# Patient Record
Sex: FEMALE | Race: BLACK OR AFRICAN AMERICAN | URBAN - METROPOLITAN AREA
[De-identification: names, ages, dates, MRNs, and addresses within clinical notes are randomized per-mention and may not be internally consistent; named-entity substitution may affect disease eponyms.]

---

## 2022-04-01 LAB
HUMAN PAPILLOMAVIRUS (HPV): NORMAL
PAP RECOMMENDATION EXT: NORMAL
PAP SMEAR: NORMAL

## 2023-01-13 ENCOUNTER — DOCUMENTATION ONLY (OUTPATIENT)
Dept: ADMINISTRATIVE | Facility: HOSPITAL | Age: 43
End: 2023-01-13

## 2024-09-25 ENCOUNTER — OFFICE VISIT (OUTPATIENT)
Dept: OBSTETRICS AND GYNECOLOGY | Facility: CLINIC | Age: 44
End: 2024-09-25
Payer: MEDICAID

## 2024-09-25 VITALS
DIASTOLIC BLOOD PRESSURE: 70 MMHG | SYSTOLIC BLOOD PRESSURE: 122 MMHG | WEIGHT: 223 LBS | HEIGHT: 65 IN | BODY MASS INDEX: 37.15 KG/M2

## 2024-09-25 DIAGNOSIS — N94.6 DYSMENORRHEA: ICD-10-CM

## 2024-09-25 DIAGNOSIS — Z01.419 WELL WOMAN EXAM WITH ROUTINE GYNECOLOGICAL EXAM: Primary | ICD-10-CM

## 2024-09-25 DIAGNOSIS — Z98.51 HISTORY OF BILATERAL TUBAL LIGATION: ICD-10-CM

## 2024-09-25 DIAGNOSIS — Z12.31 BREAST CANCER SCREENING BY MAMMOGRAM: ICD-10-CM

## 2024-09-25 DIAGNOSIS — Z12.4 CERVICAL CANCER SCREENING: ICD-10-CM

## 2024-09-25 DIAGNOSIS — N92.0 MENORRHAGIA WITH REGULAR CYCLE: ICD-10-CM

## 2024-09-25 DIAGNOSIS — Z86.018 H/O UTERINE LEIOMYOMA: ICD-10-CM

## 2024-09-25 PROCEDURE — 3008F BODY MASS INDEX DOCD: CPT | Mod: CPTII,,,

## 2024-09-25 PROCEDURE — 1159F MED LIST DOCD IN RCRD: CPT | Mod: CPTII,,,

## 2024-09-25 PROCEDURE — 3078F DIAST BP <80 MM HG: CPT | Mod: CPTII,,,

## 2024-09-25 PROCEDURE — 87624 HPV HI-RISK TYP POOLED RSLT: CPT

## 2024-09-25 PROCEDURE — 99396 PREV VISIT EST AGE 40-64: CPT | Mod: ,,,

## 2024-09-25 PROCEDURE — 1160F RVW MEDS BY RX/DR IN RCRD: CPT | Mod: CPTII,,,

## 2024-09-25 PROCEDURE — 3074F SYST BP LT 130 MM HG: CPT | Mod: CPTII,,,

## 2024-09-25 RX ORDER — TRIPROLIDINE/PSEUDOEPHEDRINE 2.5MG-60MG
30 TABLET ORAL EVERY 6 HOURS PRN
Qty: 480 ML | Refills: 6 | Status: SHIPPED | OUTPATIENT
Start: 2024-09-25

## 2024-09-25 RX ORDER — AMLODIPINE BESYLATE 10 MG/1
10 TABLET ORAL DAILY
COMMUNITY

## 2024-09-25 NOTE — PROGRESS NOTES
Chief Complaint:   Well Woman (Wants to discuss hysterectomy for fibroids. Patient was scheduled back in  but had to cancel d/t needing surgery on her leg. )     History of Present Illness:  Yaneli Keller is a 43 y.o. year old  presents for her well woman exam. Currently relying on BTL for birth control. LMP 24. Having regular monthly cycles lasting 6 days with heavy bleeding and severe dysmenorrhea. Denies any irregular menstrual bleeding.  Patient would like to discuss having a hysterectomy for uterine fibroids.  States she was scheduled back in  for a TVH with Dr. Mitchell but ended up having to cancel due to breaking her leg. 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        Gyn History:  Menstrual History  Cycle: Yes (LMP: 24)  Menarche Age: 1 years  Flow Duration: 6  Flow: (!) Heavy  Interval: 28  Intermenstrual Bleeding: No  Dysmenorrhea: Yes  Dysmenorrhea Severity : (!) Severe    Menopause  Menopause Age: 1 years    Pap History  Last pap date: 22  Result: Normal  History of Abnormal Pap: No  HPV Vaccine Completed: No    [place]  Sexually Active: Yes  Sexual Orientation: heterosexual  Postcoital Bleeding: No  Dyspareunia: No  STI History: No  Contraception: Yes  Contraception Type: Tubal ligation/salpingectony    Breast History  Last Breast Imaging Date: No  History of Breast Biopsy: No        Review of Systems:  General/Constitutional: Chills denies. Fatigue/weakness denies. Fever denies. Night sweats denies. Hot flashes denies    Respiratory: Cough denies. Hemoptysis denies. SOB denies. Sputum production denies. Wheezing denies .   Cardiovascular: Chest pain denies. Dizziness denies. Palpitations denies. Swelling in hands/feet denies.                Breast: Dimpling denies. Breast mass denies. Breast pain/tenderness denies. Nipple discharge denies. Puckering denies.  Gastrointestinal: Abdominal pain denies. Blood in stool denies. Constipation denies. Diarrhea denies.  Heartburn denies. Nausea denies. Vomiting denies    Genitourinary: Incontinence denies. Blood in urine denies. Frequent urination denies. Painful urination denies. Urinary urgency denies. Nocturia denies    Gynecologic: Irregular menses denies. Heavy bleeding admits. Painful menses admits. Vaginal discharge denies. Vaginal odor denies. Vaginal itching denies. Vaginal lesion denies. Pelvic pain denies. Decreased libido denies. Vulvar lesion denies. Prolapse of genital organs denies. Painful intercourse denies. Postcoital bleeding denies    Psychiatric: Depression denies. Anxiety denies.     OB History    Para Term  AB Living   4 4 3 1 0 1   SAB IAB Ectopic Multiple Live Births   0 0 0 0 1      # 1 - Date: 96, Sex: Female, Weight: None, GA: None, Type: Vaginal, Spontaneous, Apgar1: None, Apgar5: None, Living: None, Birth Comments: None    # 2 - Date: 98, Sex: Female, Weight: None, GA: None, Type: Vaginal, Spontaneous, Apgar1: None, Apgar5: None, Living: None, Birth Comments: None    # 3 - Date: 99, Sex: Female, Weight: None, GA: None, Type: Vaginal, Spontaneous, Apgar1: None, Apgar5: None, Living: None, Birth Comments: None    # 4 - Date: 00, Sex: Female, Weight: None, GA: None, Type: Vaginal, Spontaneous, Apgar1: None, Apgar5: None, Living: Living, Birth Comments: None      Past Medical History:   Diagnosis Date    Fibroid     Hypertension     Menorrhagia        Current Outpatient Medications:     amLODIPine (NORVASC) 10 MG tablet, Take 10 mg by mouth once daily. (Patient not taking: Reported on 2024), Disp: , Rfl:     ibuprofen 20 mg/mL oral liquid, Take 30 mLs (600 mg total) by mouth every 6 (six) hours as needed for Pain., Disp: 480 mL, Rfl: 6    Review of patient's allergies indicates:   Allergen Reactions    Lisinopril Swelling       Past Surgical History:   Procedure Laterality Date    BILATERAL TUBAL LIGATION      Leg repair surgery       TONSILLECTOMY    "    Family History   Problem Relation Name Age of Onset    Ovarian cancer Other First cousin      Social History     Socioeconomic History    Marital status:    Tobacco Use    Smoking status: Never    Smokeless tobacco: Never   Substance and Sexual Activity    Alcohol use: Not Currently    Drug use: Never    Sexual activity: Yes     Partners: Male     Birth control/protection: See Surgical Hx       Physical Exam:  /70   Ht 5' 5" (1.651 m)   Wt 101.2 kg (223 lb)   LMP 09/08/2024   BMI 37.11 kg/m²     Chaperone: present.       General appearance: healthy, well-nourished and well-developed     Psychiatric: Orientation to time, place and person. Normal mood and affect and active, alert     Skin: Appearance: no rashes or lesions.     Neck:   Neck: supple, FROM, trachea midline. and no masses   Thyroid: no enlargement or nodules and non-tender.       Cardiovascular:   Auscultation: RRR and no murmur.   Peripheral Vascular: no varicosities, LLE edema, RLE edema, calf tenderness, and palpable cord and pedal pulses intact.     Lungs:   Respiratory effort: no intercostal retractions or accessory muscle usage.   Auscultation: no wheezing, rales/crackles, or rhonchi and clear to auscultation.     Breast:   Inspection/Palpation: no tenderness, discrete/distinct masses, skin changes, or abnormal secretions. Nipple appearance normal.     Abdomen:   Auscultation/Inspection/Palpation: no hepatomegaly, splenomegaly, masses, tenderness or CVA tenderness and soft, non-distended bowel sounds preset.    Hernia: no palpable hernias.     Female Genitalia:    Vulva: no masses, tenderness or lesions    Bladder/Urethra: no urethral discharge or mass, normal meatus, bladder non-distended.    Vagina: no tenderness, erythema, cystocele, rectocele, abnormal vaginal discharge or vesicle(s) or ulcers    Cervix: no discharge, no cervical lacerations noted or motion tenderness and grossly normal    Uterus: normal size and shape and " midline, non-tender, and no uterine prolapse.    Adnexa/Parametria: no parametrial tenderness or mass, no adnexal tenderness or ovarian mass.     Lymph Nodes:   Palpation: non tender submandibular nodes, axillary nodes, or inguinal nodes.     Rectal Exam:   Rectum: normal perianal skin.       Assessment/Plan:  1. Well woman exam with routine gynecological exam  Pap   Recommend BSE monthly   Discussed recommendations of annual screening after age of 40 with mammogram  Explained that screening is not 100% reliable. Advised patient if she notices any changes to her breast including a lump, mass, dimpling, discharge, rash, or tenderness she should contact us immediately.     Healthy diet, exercise   MMG  Multivitamin   Seat belt   Sunscreen use   Safe sex/ STI education  Contraception: BTL  Annual labs: per PCP  C-scope: per PCP     2. Menorrhagia with regular cycle  -     US Pelvis Comp with Transvag NON-OB (xpd; Future; Expected date: 10/02/2024  -One swab with leuk, myco, and urea    3. Dysmenorrhea  -     ibuprofen 20 mg/mL oral liquid; Take 30 mLs (600 mg total) by mouth every 6 (six) hours as needed for Pain.  Dispense: 480 mL; Refill: 6    4. H/O uterine leiomyoma  -     US Pelvis Comp with Transvag NON-OB (xpd; Future; Expected date: 10/02/2024    5. Cervical cancer screening    6. Breast cancer screening by mammogram  -     Mammo Digital Screening Bilat w/ Titi; Future; Expected date: 10/02/2024    7. History of bilateral tubal ligation       Patient will perform a menorrhagia workup including pelvic ultrasound and 1 swab.  Patient to follow up with Dr. Mitchell in 2-3 weeks for results review and to discuss a hysterectomy.

## 2024-10-04 NOTE — PROGRESS NOTES
Chief Complaint     discuss hyst (Discussion hyst)    HPI:     Patient is a 43 y.o.  with menorrhagia and dysmenorrhea here to f/u US and discuss management.  She was scheduled for hysterectomy in , but had to cancel due to a broken leg.  She takes ibuprofen 800mg for pain with minimal relief.  She's also been on birth control in the past without relief.  She wants to proceed with surgery now.     Requests refill of HTN med.     Gyn History:    Menstrual History  Cycle: Yes (10/05/2024)  Menarche Age: 13 years  Flow Duration: 4  Flow: (!) Heavy  Interval: 28  Intermenstrual Bleeding: No  Dysmenorrhea: Yes  Dysmenorrhea Severity : (!) Severe    Menopause  Menopause Age: 1 years    Pap History  Last pap date: 24  Result: Normal  History of Abnormal Pap: No  HPV Vaccine Completed: No (0/3)    [place]  Sexually Active: Yes  Sexual Orientation: heterosexual  Postcoital Bleeding: No  Dyspareunia: No  STI History: No  Contraception: Yes  Contraception Type: Tubal ligation/salpingectony    Breast History  Last Breast Imaging Date: No  History of Breast Biopsy: No    10/8/24 pelvic u/s:      Per  previous note 24:  Chief Complaint:   Well Woman (Wants to discuss hysterectomy for fibroids. Patient was scheduled back in  but had to cancel d/t needing surgery on her leg. )      History of Present Illness:  Yaneli Keller is a 43 y.o. year old  presents for her well woman exam. Currently relying on BTL for birth control. LMP 24. Having regular monthly cycles lasting 6 days with heavy bleeding and severe dysmenorrhea. Denies any irregular menstrual bleeding.  Patient would like to discuss having a hysterectomy for uterine fibroids.  States she was scheduled back in  for a TVH with Dr. Mitchell but ended up having to cancel due to breaking her leg. 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      Past Medical History:   Diagnosis Date    Fibroid     Hypertension     Menorrhagia   "      Past Surgical History:   Procedure Laterality Date    BILATERAL TUBAL LIGATION      Leg repair surgery       TONSILLECTOMY         Family History   Problem Relation Name Age of Onset    Ovarian cancer Other First cousin        OB History          4    Para   4    Term   3       1    AB        Living   1         SAB        IAB        Ectopic        Multiple        Live Births   1                 Current Outpatient Medications on File Prior to Visit   Medication Sig Dispense Refill    amLODIPine (NORVASC) 10 MG tablet Take 10 mg by mouth once daily. (Patient not taking: Reported on 2024)      ibuprofen 20 mg/mL oral liquid Take 30 mLs (600 mg total) by mouth every 6 (six) hours as needed for Pain. 480 mL 6     No current facility-administered medications on file prior to visit.       Review of Systems:       Review of Systems   Genitourinary:  Positive for dysmenorrhea, menorrhagia and pelvic pain.   All other systems reviewed and are negative.       Physical Exam:    /74   Temp 97.9 °F (36.6 °C) (Temporal)   Ht 5' 5" (1.651 m)   Wt 98.8 kg (217 lb 12.8 oz)   LMP 10/05/2024 (Exact Date)   BMI 36.24 kg/m²     Physical Exam   Vitals reviewed.   Deferred      Assessment:   1. Menorrhagia with regular cycle    2. Dysmenorrhea    3. Hypertension, unspecified type  -     amLODIPine (NORVASC) 10 MG tablet; Take 1 tablet (10 mg total) by mouth once daily.  Dispense: 30 tablet; Refill: 1    4. BMI 36.0-36.9,adult             Plan:   Discussed symptoms, labs, and imaging.  Discussed hysterectomy in detail.  Plan for TVH/Cysto  Discussed oophorectomy and associated risks including increased risk for cardiovascular disease, osteoporosis, menopausal symptoms (hot flashes, vaginal dryness, etc), and cognitive decline.  Desires ovary sparing procedure if ovaries appear normal  Plan for surgery on 24    Amlodipine refill sent.  She will need to follow up with her PCP for HTN management. "   RTC 1 month to pre-op

## 2024-10-08 ENCOUNTER — HOSPITAL ENCOUNTER (OUTPATIENT)
Dept: RADIOLOGY | Facility: HOSPITAL | Age: 44
Discharge: HOME | End: 2024-10-08
Payer: MEDICAID

## 2024-10-08 DIAGNOSIS — Z86.018 H/O UTERINE LEIOMYOMA: ICD-10-CM

## 2024-10-08 DIAGNOSIS — N92.0 MENORRHAGIA WITH REGULAR CYCLE: ICD-10-CM

## 2024-10-08 PROCEDURE — 76856 US EXAM PELVIC COMPLETE: CPT | Mod: TC

## 2024-10-10 ENCOUNTER — OFFICE VISIT (OUTPATIENT)
Dept: OBSTETRICS AND GYNECOLOGY | Facility: CLINIC | Age: 44
End: 2024-10-10
Payer: MEDICAID

## 2024-10-10 VITALS
TEMPERATURE: 98 F | HEIGHT: 65 IN | WEIGHT: 217.81 LBS | SYSTOLIC BLOOD PRESSURE: 130 MMHG | DIASTOLIC BLOOD PRESSURE: 74 MMHG | BODY MASS INDEX: 36.29 KG/M2

## 2024-10-10 DIAGNOSIS — N92.0 MENORRHAGIA WITH REGULAR CYCLE: Primary | ICD-10-CM

## 2024-10-10 DIAGNOSIS — I10 HYPERTENSION, UNSPECIFIED TYPE: ICD-10-CM

## 2024-10-10 DIAGNOSIS — N94.6 DYSMENORRHEA: ICD-10-CM

## 2024-10-10 PROCEDURE — 3075F SYST BP GE 130 - 139MM HG: CPT | Mod: CPTII,,, | Performed by: OBSTETRICS & GYNECOLOGY

## 2024-10-10 PROCEDURE — 1159F MED LIST DOCD IN RCRD: CPT | Mod: CPTII,,, | Performed by: OBSTETRICS & GYNECOLOGY

## 2024-10-10 PROCEDURE — 99213 OFFICE O/P EST LOW 20 MIN: CPT | Mod: ,,, | Performed by: OBSTETRICS & GYNECOLOGY

## 2024-10-10 PROCEDURE — 3078F DIAST BP <80 MM HG: CPT | Mod: CPTII,,, | Performed by: OBSTETRICS & GYNECOLOGY

## 2024-10-10 PROCEDURE — 3008F BODY MASS INDEX DOCD: CPT | Mod: CPTII,,, | Performed by: OBSTETRICS & GYNECOLOGY

## 2024-10-10 RX ORDER — AMLODIPINE BESYLATE 10 MG/1
10 TABLET ORAL DAILY
Qty: 30 TABLET | Refills: 1 | Status: SHIPPED | OUTPATIENT
Start: 2024-10-10

## 2024-10-10 RX ORDER — METHOCARBAMOL 750 MG/1
TABLET, FILM COATED ORAL
COMMUNITY
Start: 2024-09-17

## 2024-10-22 ENCOUNTER — HOSPITAL ENCOUNTER (OUTPATIENT)
Dept: RADIOLOGY | Facility: HOSPITAL | Age: 44
Discharge: HOME | End: 2024-10-22
Payer: MEDICAID

## 2024-10-22 DIAGNOSIS — Z12.31 BREAST CANCER SCREENING BY MAMMOGRAM: ICD-10-CM

## 2024-10-22 PROCEDURE — 77067 SCR MAMMO BI INCL CAD: CPT | Mod: TC

## 2024-11-08 ENCOUNTER — OFFICE VISIT (OUTPATIENT)
Dept: OBSTETRICS AND GYNECOLOGY | Facility: CLINIC | Age: 44
End: 2024-11-08
Payer: MEDICAID

## 2024-11-08 VITALS — WEIGHT: 224 LBS | SYSTOLIC BLOOD PRESSURE: 130 MMHG | DIASTOLIC BLOOD PRESSURE: 82 MMHG | BODY MASS INDEX: 37.28 KG/M2

## 2024-11-08 DIAGNOSIS — N92.0 MENORRHAGIA WITH REGULAR CYCLE: Primary | ICD-10-CM

## 2024-11-08 DIAGNOSIS — N94.6 DYSMENORRHEA: ICD-10-CM

## 2024-11-08 RX ORDER — CEFAZOLIN SODIUM 2 G/50ML
2 SOLUTION INTRAVENOUS
OUTPATIENT
Start: 2024-11-08

## 2024-11-08 RX ORDER — SODIUM CHLORIDE 9 MG/ML
INJECTION, SOLUTION INTRAVENOUS CONTINUOUS
OUTPATIENT
Start: 2024-11-08

## 2024-11-08 RX ORDER — MUPIROCIN 20 MG/G
OINTMENT TOPICAL
OUTPATIENT
Start: 2024-11-08

## 2024-11-08 RX ORDER — FAMOTIDINE 20 MG/1
20 TABLET, FILM COATED ORAL
OUTPATIENT
Start: 2024-11-08

## 2024-11-08 NOTE — PROGRESS NOTES
History & Physical    Subjective     History of Present Illness:  Patient is a 44 y.o. female  presents today to pre admit for TVH/Cysto on 24 due to menorrhagia, dysmenorrhea.     Gyn History:    Menstrual History  Cycle: Yes (LMP: 11/3/24)  Menarche Age: 13 years  Flow Duration: 4  Flow: (!) Heavy  Interval: 28  Intermenstrual Bleeding: No  Dysmenorrhea: Yes  Dysmenorrhea Severity : (!) Severe    Menopause  Menopause Age: 1 years    Pap History  Last pap date: 24  Result: Normal  History of Abnormal Pap: No  HPV Vaccine Completed: No (0/3)    [place]  Sexually Active: Yes  Sexual Orientation: heterosexual  Postcoital Bleeding: No  Dyspareunia: No  STI History: No  Contraception: Yes  Contraception Type: Tubal ligation/salpingectony    Breast History  Last Breast Imaging Date: No  History of Breast Biopsy: No    Per previous note 10/10/24  Patient is a 43 y.o.  with menorrhagia and dysmenorrhea here to f/u US and discuss management.  She was scheduled for hysterectomy in , but had to cancel due to a broken leg.  She takes ibuprofen 800mg for pain with minimal relief.  She's also been on birth control in the past without relief.  She wants to proceed with surgery now.      Requests refill of HTN med.     10/8/24 pelvic u/s:   FINDINGS:  Uterus:  Size: 10.1 x 7.3 x 0.8 cm  Appearance: No focal mass identified.  There is thickening and heterogeneity of the anterior myometrium with Venetian blind shadowing.  Similar findings may be seen in the setting of adenomyosis.  Endometrium: Normal, measuring 4 mm.  Right ovary:  Size: 2.4 x 2.2 x 1.9 cm  Appearance: Normal  Vascular flow: Normal.  Left ovary:  Size: 2.6 x 2.1 x 1.8 cm  Appearance: Normal  Vascular Flow: Normal.  Free Fluid:  None.  Impression:  Findings in the myometrium suggestive of adenomyosis.      Per  previous note 24:  Chief Complaint:   Well Woman (Wants to discuss hysterectomy for fibroids. Patient was scheduled  back in  but had to cancel d/t needing surgery on her leg. )      History of Present Illness:  Yaneli Keller is a 43 y.o. year old  presents for her well woman exam. Currently relying on BTL for birth control. LMP 24. Having regular monthly cycles lasting 6 days with heavy bleeding and severe dysmenorrhea. Denies any irregular menstrual bleeding.  Patient would like to discuss having a hysterectomy for uterine fibroids.  States she was scheduled back in  for a TVH with Dr. Mitchell but ended up having to cancel due to breaking her leg. 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    Chief Complaint   Patient presents with    Pre op     Pre op for TVH/Cysto       Review of patient's allergies indicates:   Allergen Reactions    Lisinopril Swelling       Current Outpatient Medications   Medication Sig Dispense Refill    amLODIPine (NORVASC) 10 MG tablet Take 1 tablet (10 mg total) by mouth once daily. 30 tablet 1    ibuprofen 20 mg/mL oral liquid Take 30 mLs (600 mg total) by mouth every 6 (six) hours as needed for Pain. 480 mL 6    methocarbamoL (ROBAXIN) 750 MG Tab Take by mouth.       No current facility-administered medications for this visit.       Past Medical History:   Diagnosis Date    Fibroid     Hypertension     Menorrhagia      Past Surgical History:   Procedure Laterality Date    BILATERAL TUBAL LIGATION      Leg repair surgery       TONSILLECTOMY       Family History   Problem Relation Name Age of Onset    Ovarian cancer Other First cousin         age unknown 30s    Breast cancer Maternal Aunt          age unknown    Colon cancer Neg Hx      Uterine cancer Neg Hx       Social History     Tobacco Use    Smoking status: Never     Passive exposure: Never    Smokeless tobacco: Never   Substance Use Topics    Alcohol use: Not Currently    Drug use: Never        Review of Systems:  Review of Systems   Respiratory: Negative.     Cardiovascular: Negative.    Gastrointestinal: Negative.     Genitourinary:  Positive for menstrual problem.          Objective     Vital Signs (Most Recent)  BP: 130/82 (11/08/24 1006)     101.6 kg (224 lb)     Physical Exam:  Physical Exam  Physical Exam  Gen: NAD  Cardio: RRR  Lungs CTA b/l  Abd: Soft, NT  Ext: no CCE         Assessment and Plan   1. Menorrhagia with regular cycle  - Case Request Operating Room: HYSTERECTOMY, TOTAL, VAGINAL, WITH CYSTOSCOPY  - Full code; Standing  - Vital signs; Standing  - Insert peripheral IV; Standing  - Gómez to Gravity; Standing  - POCT glucose; Standing  - Notify physician if BS > 180 for hysterectomy patients; Standing  - Chlorhexidine (CHG) 2% Wipes; Standing  - Notify Physician/Vital Signs Parameters Urine output less than 0.5mL/kg/hr (with indwelling catheter) or 30 mL/hr (without indwelling catheter) or blood glucose greater than 200 mg/dL; Standing  - Notify physician; Standing  - Notify Physician - Potential Need of Opioid Reversal; Standing  - Diet NPO; Standing  - Chlorohexidine Gluconate Bath; Standing  - Place in Outpatient; Standing  - Place sequential compression device; Standing  - Comprehensive metabolic panel; Standing  - CBC auto differential; Standing  - Pregnancy, urine rapid; Standing  - Urinalysis, Reflex to Urine Culture; Standing  - Type & Screen Pre Op; Standing    2. Dysmenorrhea  - Case Request Operating Room: HYSTERECTOMY, TOTAL, VAGINAL, WITH CYSTOSCOPY  - Full code; Standing  - Vital signs; Standing  - Insert peripheral IV; Standing  - Gómez to Gravity; Standing  - POCT glucose; Standing  - Notify physician if BS > 180 for hysterectomy patients; Standing  - Chlorhexidine (CHG) 2% Wipes; Standing  - Notify Physician/Vital Signs Parameters Urine output less than 0.5mL/kg/hr (with indwelling catheter) or 30 mL/hr (without indwelling catheter) or blood glucose greater than 200 mg/dL; Standing  - Notify physician; Standing  - Notify Physician - Potential Need of Opioid Reversal; Standing  - Diet NPO;  Standing  - Chlorohexidine Gluconate Bath; Standing  - Place in Outpatient; Standing  - Place sequential compression device; Standing  - Comprehensive metabolic panel; Standing  - CBC auto differential; Standing  - Pregnancy, urine rapid; Standing  - Urinalysis, Reflex to Urine Culture; Standing  - Type & Screen Pre Op; Standing    3. BMI 37.0-37.9, adult  - Case Request Operating Room: HYSTERECTOMY, TOTAL, VAGINAL, WITH CYSTOSCOPY  - Full code; Standing  - Vital signs; Standing  - Insert peripheral IV; Standing  - Gómez to Gravity; Standing  - POCT glucose; Standing  - Notify physician if BS > 180 for hysterectomy patients; Standing  - Chlorhexidine (CHG) 2% Wipes; Standing  - Notify Physician/Vital Signs Parameters Urine output less than 0.5mL/kg/hr (with indwelling catheter) or 30 mL/hr (without indwelling catheter) or blood glucose greater than 200 mg/dL; Standing  - Notify physician; Standing  - Notify Physician - Potential Need of Opioid Reversal; Standing  - Diet NPO; Standing  - Chlorohexidine Gluconate Bath; Standing  - Place in Outpatient; Standing  - Place sequential compression device; Standing  - Comprehensive metabolic panel; Standing  - CBC auto differential; Standing  - Pregnancy, urine rapid; Standing  - Urinalysis, Reflex to Urine Culture; Standing  - Type & Screen Pre Op; Standing        PLAN:      Discussed symptoms, labs, and imaging.  Discussed planned procedure and associated risks in detail.  Discussed oophorectomy and associated risks including increased risk for cardiovascular disease, osteoporosis, menopausal symptoms (hot flashes, vaginal dryness, etc), and cognitive decline.  Consent given for procedure  Plan for surgery on   NPO after midnight before surgery    Discussed with patient nation wide IV Fluid shortage at this time and inability to perform elective procedures at this time.   Will call pt if surgery needs to be r/s due to IV shortage     Keep f/u appts with PCP for HTN control      RTC post op     This note was transcribed by Char Erwin. There may be transcription errors as a result, however minimal. Effort has been made to ensure accuracy of transcription, but any obvious errors or omissions should be clarified with the author of the document.       I agree with the above documentation.

## 2024-11-08 NOTE — H&P (VIEW-ONLY)
History & Physical    Subjective     History of Present Illness:  Patient is a 44 y.o. female  presents today to pre admit for TVH/Cysto on 24 due to menorrhagia, dysmenorrhea.     Gyn History:    Menstrual History  Cycle: Yes (LMP: 11/3/24)  Menarche Age: 13 years  Flow Duration: 4  Flow: (!) Heavy  Interval: 28  Intermenstrual Bleeding: No  Dysmenorrhea: Yes  Dysmenorrhea Severity : (!) Severe    Menopause  Menopause Age: 1 years    Pap History  Last pap date: 24  Result: Normal  History of Abnormal Pap: No  HPV Vaccine Completed: No (0/3)    [place]  Sexually Active: Yes  Sexual Orientation: heterosexual  Postcoital Bleeding: No  Dyspareunia: No  STI History: No  Contraception: Yes  Contraception Type: Tubal ligation/salpingectony    Breast History  Last Breast Imaging Date: No  History of Breast Biopsy: No    Per previous note 10/10/24  Patient is a 43 y.o.  with menorrhagia and dysmenorrhea here to f/u US and discuss management.  She was scheduled for hysterectomy in , but had to cancel due to a broken leg.  She takes ibuprofen 800mg for pain with minimal relief.  She's also been on birth control in the past without relief.  She wants to proceed with surgery now.      Requests refill of HTN med.     10/8/24 pelvic u/s:   FINDINGS:  Uterus:  Size: 10.1 x 7.3 x 0.8 cm  Appearance: No focal mass identified.  There is thickening and heterogeneity of the anterior myometrium with Venetian blind shadowing.  Similar findings may be seen in the setting of adenomyosis.  Endometrium: Normal, measuring 4 mm.  Right ovary:  Size: 2.4 x 2.2 x 1.9 cm  Appearance: Normal  Vascular flow: Normal.  Left ovary:  Size: 2.6 x 2.1 x 1.8 cm  Appearance: Normal  Vascular Flow: Normal.  Free Fluid:  None.  Impression:  Findings in the myometrium suggestive of adenomyosis.      Per  previous note 24:  Chief Complaint:   Well Woman (Wants to discuss hysterectomy for fibroids. Patient was scheduled  back in  but had to cancel d/t needing surgery on her leg. )      History of Present Illness:  Yaneli Keller is a 43 y.o. year old  presents for her well woman exam. Currently relying on BTL for birth control. LMP 24. Having regular monthly cycles lasting 6 days with heavy bleeding and severe dysmenorrhea. Denies any irregular menstrual bleeding.  Patient would like to discuss having a hysterectomy for uterine fibroids.  States she was scheduled back in  for a TVH with Dr. Mitchell but ended up having to cancel due to breaking her leg. 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    Chief Complaint   Patient presents with    Pre op     Pre op for TVH/Cysto       Review of patient's allergies indicates:   Allergen Reactions    Lisinopril Swelling       Current Outpatient Medications   Medication Sig Dispense Refill    amLODIPine (NORVASC) 10 MG tablet Take 1 tablet (10 mg total) by mouth once daily. 30 tablet 1    ibuprofen 20 mg/mL oral liquid Take 30 mLs (600 mg total) by mouth every 6 (six) hours as needed for Pain. 480 mL 6    methocarbamoL (ROBAXIN) 750 MG Tab Take by mouth.       No current facility-administered medications for this visit.       Past Medical History:   Diagnosis Date    Fibroid     Hypertension     Menorrhagia      Past Surgical History:   Procedure Laterality Date    BILATERAL TUBAL LIGATION      Leg repair surgery       TONSILLECTOMY       Family History   Problem Relation Name Age of Onset    Ovarian cancer Other First cousin         age unknown 30s    Breast cancer Maternal Aunt          age unknown    Colon cancer Neg Hx      Uterine cancer Neg Hx       Social History     Tobacco Use    Smoking status: Never     Passive exposure: Never    Smokeless tobacco: Never   Substance Use Topics    Alcohol use: Not Currently    Drug use: Never        Review of Systems:  Review of Systems   Respiratory: Negative.     Cardiovascular: Negative.    Gastrointestinal: Negative.     Genitourinary:  Positive for menstrual problem.          Objective     Vital Signs (Most Recent)  BP: 130/82 (11/08/24 1006)     101.6 kg (224 lb)     Physical Exam:  Physical Exam  Physical Exam  Gen: NAD  Cardio: RRR  Lungs CTA b/l  Abd: Soft, NT  Ext: no CCE         Assessment and Plan   1. Menorrhagia with regular cycle  - Case Request Operating Room: HYSTERECTOMY, TOTAL, VAGINAL, WITH CYSTOSCOPY  - Full code; Standing  - Vital signs; Standing  - Insert peripheral IV; Standing  - Gómez to Gravity; Standing  - POCT glucose; Standing  - Notify physician if BS > 180 for hysterectomy patients; Standing  - Chlorhexidine (CHG) 2% Wipes; Standing  - Notify Physician/Vital Signs Parameters Urine output less than 0.5mL/kg/hr (with indwelling catheter) or 30 mL/hr (without indwelling catheter) or blood glucose greater than 200 mg/dL; Standing  - Notify physician; Standing  - Notify Physician - Potential Need of Opioid Reversal; Standing  - Diet NPO; Standing  - Chlorohexidine Gluconate Bath; Standing  - Place in Outpatient; Standing  - Place sequential compression device; Standing  - Comprehensive metabolic panel; Standing  - CBC auto differential; Standing  - Pregnancy, urine rapid; Standing  - Urinalysis, Reflex to Urine Culture; Standing  - Type & Screen Pre Op; Standing    2. Dysmenorrhea  - Case Request Operating Room: HYSTERECTOMY, TOTAL, VAGINAL, WITH CYSTOSCOPY  - Full code; Standing  - Vital signs; Standing  - Insert peripheral IV; Standing  - Gómez to Gravity; Standing  - POCT glucose; Standing  - Notify physician if BS > 180 for hysterectomy patients; Standing  - Chlorhexidine (CHG) 2% Wipes; Standing  - Notify Physician/Vital Signs Parameters Urine output less than 0.5mL/kg/hr (with indwelling catheter) or 30 mL/hr (without indwelling catheter) or blood glucose greater than 200 mg/dL; Standing  - Notify physician; Standing  - Notify Physician - Potential Need of Opioid Reversal; Standing  - Diet NPO;  Standing  - Chlorohexidine Gluconate Bath; Standing  - Place in Outpatient; Standing  - Place sequential compression device; Standing  - Comprehensive metabolic panel; Standing  - CBC auto differential; Standing  - Pregnancy, urine rapid; Standing  - Urinalysis, Reflex to Urine Culture; Standing  - Type & Screen Pre Op; Standing    3. BMI 37.0-37.9, adult  - Case Request Operating Room: HYSTERECTOMY, TOTAL, VAGINAL, WITH CYSTOSCOPY  - Full code; Standing  - Vital signs; Standing  - Insert peripheral IV; Standing  - Gómez to Gravity; Standing  - POCT glucose; Standing  - Notify physician if BS > 180 for hysterectomy patients; Standing  - Chlorhexidine (CHG) 2% Wipes; Standing  - Notify Physician/Vital Signs Parameters Urine output less than 0.5mL/kg/hr (with indwelling catheter) or 30 mL/hr (without indwelling catheter) or blood glucose greater than 200 mg/dL; Standing  - Notify physician; Standing  - Notify Physician - Potential Need of Opioid Reversal; Standing  - Diet NPO; Standing  - Chlorohexidine Gluconate Bath; Standing  - Place in Outpatient; Standing  - Place sequential compression device; Standing  - Comprehensive metabolic panel; Standing  - CBC auto differential; Standing  - Pregnancy, urine rapid; Standing  - Urinalysis, Reflex to Urine Culture; Standing  - Type & Screen Pre Op; Standing        PLAN:      Discussed symptoms, labs, and imaging.  Discussed planned procedure and associated risks in detail.  Discussed oophorectomy and associated risks including increased risk for cardiovascular disease, osteoporosis, menopausal symptoms (hot flashes, vaginal dryness, etc), and cognitive decline.  Consent given for procedure  Plan for surgery on   NPO after midnight before surgery    Discussed with patient nation wide IV Fluid shortage at this time and inability to perform elective procedures at this time.   Will call pt if surgery needs to be r/s due to IV shortage     Keep f/u appts with PCP for HTN control      RTC post op     This note was transcribed by Char Erwin. There may be transcription errors as a result, however minimal. Effort has been made to ensure accuracy of transcription, but any obvious errors or omissions should be clarified with the author of the document.       I agree with the above documentation.

## 2024-11-26 ENCOUNTER — HOSPITAL ENCOUNTER (OUTPATIENT)
Dept: PREADMISSION TESTING | Facility: HOSPITAL | Age: 44
Discharge: HOME | End: 2024-11-26
Attending: OBSTETRICS & GYNECOLOGY
Payer: MEDICAID

## 2024-11-26 VITALS — BODY MASS INDEX: 37.32 KG/M2 | WEIGHT: 224 LBS | HEIGHT: 65 IN

## 2024-11-26 DIAGNOSIS — N94.6 DYSMENORRHEA: ICD-10-CM

## 2024-11-26 DIAGNOSIS — N92.0 MENORRHAGIA WITH REGULAR CYCLE: ICD-10-CM

## 2024-11-26 LAB
ALBUMIN SERPL-MCNC: 4.3 G/DL (ref 3.4–5)
ALBUMIN/GLOB SERPL: 1.4 RATIO
ALP SERPL-CCNC: 63 UNIT/L (ref 50–144)
ALT SERPL-CCNC: 11 UNIT/L (ref 1–45)
ANION GAP SERPL CALC-SCNC: 7 MEQ/L (ref 2–13)
ANISOCYTOSIS BLD QL SMEAR: ABNORMAL
AST SERPL-CCNC: 20 UNIT/L (ref 14–36)
BASOPHILS # BLD AUTO: 0.04 X10(3)/MCL (ref 0.01–0.08)
BASOPHILS NFR BLD AUTO: 0.6 % (ref 0.1–1.2)
BILIRUB SERPL-MCNC: 0.3 MG/DL (ref 0–1)
BILIRUB UR QL STRIP.AUTO: NEGATIVE
BUN SERPL-MCNC: 15 MG/DL (ref 7–20)
CALCIUM SERPL-MCNC: 9.3 MG/DL (ref 8.4–10.2)
CHLORIDE SERPL-SCNC: 108 MMOL/L (ref 98–110)
CLARITY UR: CLEAR
CO2 SERPL-SCNC: 24 MMOL/L (ref 21–32)
COLOR UR AUTO: YELLOW
CREAT SERPL-MCNC: 0.72 MG/DL (ref 0.66–1.25)
CREAT/UREA NIT SERPL: 21 (ref 12–20)
ELLIPTOCYTOSIS (OHS): SLIGHT
EOSINOPHIL # BLD AUTO: 0.18 X10(3)/MCL (ref 0.04–0.36)
EOSINOPHIL NFR BLD AUTO: 2.7 % (ref 0.7–7)
ERYTHROCYTE [DISTWIDTH] IN BLOOD BY AUTOMATED COUNT: 18.4 % (ref 11–14.5)
GFR SERPLBLD CREATININE-BSD FMLA CKD-EPI: >90 ML/MIN/1.73/M2
GLOBULIN SER-MCNC: 3.1 GM/DL (ref 2–3.9)
GLUCOSE SERPL-MCNC: 105 MG/DL (ref 70–115)
GLUCOSE UR QL STRIP: NEGATIVE
HCT VFR BLD AUTO: 27.8 % (ref 36–48)
HGB BLD-MCNC: 7.7 G/DL (ref 11.8–16)
HGB UR QL STRIP: NEGATIVE
IMM GRANULOCYTES # BLD AUTO: 0.02 X10(3)/MCL (ref 0–0.03)
IMM GRANULOCYTES NFR BLD AUTO: 0.3 % (ref 0–0.5)
KETONES UR QL STRIP: NEGATIVE
LEUKOCYTE ESTERASE UR QL STRIP: NEGATIVE
LYMPHOCYTES # BLD AUTO: 2.17 X10(3)/MCL (ref 1.16–3.74)
LYMPHOCYTES NFR BLD AUTO: 32.9 % (ref 20–55)
MCH RBC QN AUTO: 17.9 PG (ref 27–34)
MCHC RBC AUTO-ENTMCNC: 27.7 G/DL (ref 31–37)
MCV RBC AUTO: 64.5 FL (ref 79–99)
MICROCYTES BLD QL SMEAR: ABNORMAL
MONOCYTES # BLD AUTO: 0.34 X10(3)/MCL (ref 0.24–0.36)
MONOCYTES NFR BLD AUTO: 5.2 % (ref 4.7–12.5)
NEUTROPHILS # BLD AUTO: 3.84 X10(3)/MCL (ref 1.56–6.13)
NEUTROPHILS NFR BLD AUTO: 58.3 % (ref 37–73)
NITRITE UR QL STRIP: NEGATIVE
NRBC BLD AUTO-RTO: 0 %
PH UR STRIP: 6.5 [PH]
PLATELET # BLD AUTO: 548 X10(3)/MCL (ref 140–371)
PLATELET # BLD EST: ABNORMAL 10*3/UL
PMV BLD AUTO: 8.6 FL (ref 9.4–12.4)
POIKILOCYTOSIS BLD QL SMEAR: SLIGHT
POTASSIUM SERPL-SCNC: 4.8 MMOL/L (ref 3.5–5.1)
PROT SERPL-MCNC: 7.4 GM/DL (ref 6.3–8.2)
PROT UR QL STRIP: NEGATIVE
RBC # BLD AUTO: 4.31 X10(6)/MCL (ref 4–5.1)
RBC MORPH BLD: ABNORMAL
SODIUM SERPL-SCNC: 139 MMOL/L (ref 136–145)
SP GR UR STRIP.AUTO: 1.01 (ref 1–1.03)
UROBILINOGEN UR STRIP-ACNC: 0.2
WBC # BLD AUTO: 6.59 X10(3)/MCL (ref 4–11.5)

## 2024-11-26 PROCEDURE — 85025 COMPLETE CBC W/AUTO DIFF WBC: CPT | Performed by: OBSTETRICS & GYNECOLOGY

## 2024-11-26 PROCEDURE — 80053 COMPREHEN METABOLIC PANEL: CPT | Performed by: OBSTETRICS & GYNECOLOGY

## 2024-11-26 PROCEDURE — 81003 URINALYSIS AUTO W/O SCOPE: CPT | Performed by: OBSTETRICS & GYNECOLOGY

## 2024-11-26 NOTE — DISCHARGE INSTRUCTIONS

## 2024-12-04 ENCOUNTER — ANESTHESIA EVENT (OUTPATIENT)
Dept: SURGERY | Facility: HOSPITAL | Age: 44
End: 2024-12-04
Payer: MEDICAID

## 2024-12-04 NOTE — ANESTHESIA PREPROCEDURE EVALUATION
12/04/2024  Yaneli Keller is a 44 y.o., female.  Surgical History    Procedure Laterality Date Comment Source   BILATERAL TUBAL LIGATION       Leg repair surgery  2022     TONSILLECTOMY         Medical History    Diagnosis Date Comment Source   Fibroid      Hypertension      Menorrhagia          Pre-op Assessment    I have reviewed the Patient Summary Reports.     I have reviewed the Nursing Notes. I have reviewed the NPO Status.   I have reviewed the Medications.     Review of Systems  Anesthesia Hx:  No problems with previous Anesthesia             Denies Family Hx of Anesthesia complications.    Denies Personal Hx of Anesthesia complications.                    Social:  Non-Smoker       Hematology/Oncology:    Oncology Normal    -- Anemia:                                  EENT/Dental:  EENT/Dental Normal           Cardiovascular:  Exercise tolerance: poor   Hypertension                                          Pulmonary:  Pulmonary Normal                       Renal/:  Renal/ Normal                 Hepatic/GI:  Hepatic/GI Normal                    Musculoskeletal:  Musculoskeletal Normal                Neurological:  Neurology Normal                                      Endocrine:  Endocrine Normal          Obesity / BMI > 30  Dermatological:  Skin Normal    Psych:  Psychiatric Normal                    Physical Exam  General: Well nourished, Cooperative, Alert and Oriented    Airway:  Mallampati: III / II/ III  Mouth Opening: Normal  TM Distance: Normal  Tongue: Large  Neck ROM: Normal ROM    Dental:  Intact        Anesthesia Plan  Type of Anesthesia, risks & benefits discussed:    Anesthesia Type: Gen ETT  Intra-op Monitoring Plan: Standard ASA Monitors  Post Op Pain Control Plan: multimodal analgesia  Induction:  IV  Airway Plan: Direct  Informed Consent: Informed consent signed with the Patient  and all parties understand the risks and agree with anesthesia plan.  All questions answered. Patient consented to blood products? Yes  ASA Score: 3  Day of Surgery Review of History & Physical: H&P Update referred to the surgeon/provider.I have interviewed and examined the patient. I have reviewed the patient's H&P dated: There are no significant changes.     Ready For Surgery From Anesthesia Perspective.     .

## 2024-12-05 ENCOUNTER — HOSPITAL ENCOUNTER (OUTPATIENT)
Facility: HOSPITAL | Age: 44
Discharge: HOME | End: 2024-12-05
Attending: OBSTETRICS & GYNECOLOGY | Admitting: OBSTETRICS & GYNECOLOGY
Payer: MEDICAID

## 2024-12-05 ENCOUNTER — ANESTHESIA (OUTPATIENT)
Dept: SURGERY | Facility: HOSPITAL | Age: 44
End: 2024-12-05
Payer: MEDICAID

## 2024-12-05 VITALS
RESPIRATION RATE: 20 BRPM | OXYGEN SATURATION: 99 % | SYSTOLIC BLOOD PRESSURE: 132 MMHG | HEART RATE: 75 BPM | WEIGHT: 224 LBS | DIASTOLIC BLOOD PRESSURE: 73 MMHG | HEIGHT: 65 IN | BODY MASS INDEX: 37.32 KG/M2 | TEMPERATURE: 97 F

## 2024-12-05 DIAGNOSIS — N94.6 DYSMENORRHEA: ICD-10-CM

## 2024-12-05 DIAGNOSIS — N92.0 MENORRHAGIA WITH REGULAR CYCLE: ICD-10-CM

## 2024-12-05 DIAGNOSIS — Z90.710 S/P VAGINAL HYSTERECTOMY: Primary | ICD-10-CM

## 2024-12-05 LAB
ABORH RETYPE: NORMAL
B-HCG UR QL: NEGATIVE
GROUP & RH: NORMAL
HCT VFR BLD AUTO: 29 % (ref 36–48)
HGB BLD-MCNC: 8.5 G/DL (ref 11.8–16)
INDIRECT COOMBS: NORMAL
SPECIMEN OUTDATE: NORMAL

## 2024-12-05 PROCEDURE — 36000708 HC OR TIME LEV III 1ST 15 MIN: Performed by: OBSTETRICS & GYNECOLOGY

## 2024-12-05 PROCEDURE — 37000009 HC ANESTHESIA EA ADD 15 MINS: Performed by: OBSTETRICS & GYNECOLOGY

## 2024-12-05 PROCEDURE — 36000709 HC OR TIME LEV III EA ADD 15 MIN: Performed by: OBSTETRICS & GYNECOLOGY

## 2024-12-05 PROCEDURE — 27201423 OPTIME MED/SURG SUP & DEVICES STERILE SUPPLY: Performed by: OBSTETRICS & GYNECOLOGY

## 2024-12-05 PROCEDURE — 25000003 PHARM REV CODE 250

## 2024-12-05 PROCEDURE — 71000033 HC RECOVERY, INTIAL HOUR: Performed by: OBSTETRICS & GYNECOLOGY

## 2024-12-05 PROCEDURE — 81025 URINE PREGNANCY TEST: CPT | Performed by: OBSTETRICS & GYNECOLOGY

## 2024-12-05 PROCEDURE — 86901 BLOOD TYPING SEROLOGIC RH(D): CPT | Performed by: OBSTETRICS & GYNECOLOGY

## 2024-12-05 PROCEDURE — 25000003 PHARM REV CODE 250: Performed by: OBSTETRICS & GYNECOLOGY

## 2024-12-05 PROCEDURE — P9016 RBC LEUKOCYTES REDUCED: HCPCS | Performed by: OBSTETRICS & GYNECOLOGY

## 2024-12-05 PROCEDURE — 85018 HEMOGLOBIN: CPT | Performed by: OBSTETRICS & GYNECOLOGY

## 2024-12-05 PROCEDURE — 36415 COLL VENOUS BLD VENIPUNCTURE: CPT | Mod: 91 | Performed by: OBSTETRICS & GYNECOLOGY

## 2024-12-05 PROCEDURE — 71000016 HC POSTOP RECOV ADDL HR: Performed by: OBSTETRICS & GYNECOLOGY

## 2024-12-05 PROCEDURE — 25000003 PHARM REV CODE 250: Performed by: NURSE ANESTHETIST, CERTIFIED REGISTERED

## 2024-12-05 PROCEDURE — 63600175 PHARM REV CODE 636 W HCPCS: Performed by: NURSE ANESTHETIST, CERTIFIED REGISTERED

## 2024-12-05 PROCEDURE — 86923 COMPATIBILITY TEST ELECTRIC: CPT | Performed by: OBSTETRICS & GYNECOLOGY

## 2024-12-05 PROCEDURE — 71000015 HC POSTOP RECOV 1ST HR: Performed by: OBSTETRICS & GYNECOLOGY

## 2024-12-05 PROCEDURE — 63600175 PHARM REV CODE 636 W HCPCS

## 2024-12-05 PROCEDURE — 63600175 PHARM REV CODE 636 W HCPCS: Performed by: OBSTETRICS & GYNECOLOGY

## 2024-12-05 PROCEDURE — 37000008 HC ANESTHESIA 1ST 15 MINUTES: Performed by: OBSTETRICS & GYNECOLOGY

## 2024-12-05 RX ORDER — OXYCODONE AND ACETAMINOPHEN 10; 325 MG/1; MG/1
1 TABLET ORAL EVERY 4 HOURS PRN
Status: DISCONTINUED | OUTPATIENT
Start: 2024-12-05 | End: 2024-12-05 | Stop reason: HOSPADM

## 2024-12-05 RX ORDER — IBUPROFEN 600 MG/1
600 TABLET ORAL EVERY 6 HOURS
Status: DISCONTINUED | OUTPATIENT
Start: 2024-12-06 | End: 2024-12-05 | Stop reason: HOSPADM

## 2024-12-05 RX ORDER — HYDROCODONE BITARTRATE AND ACETAMINOPHEN 7.5; 325 MG/1; MG/1
1 TABLET ORAL EVERY 6 HOURS PRN
Qty: 12 TABLET | Refills: 0 | Status: SHIPPED | OUTPATIENT
Start: 2024-12-05

## 2024-12-05 RX ORDER — DIPHENHYDRAMINE HCL 25 MG
25 CAPSULE ORAL EVERY 4 HOURS PRN
Status: DISCONTINUED | OUTPATIENT
Start: 2024-12-05 | End: 2024-12-05 | Stop reason: HOSPADM

## 2024-12-05 RX ORDER — KETOROLAC TROMETHAMINE 30 MG/ML
30 INJECTION, SOLUTION INTRAMUSCULAR; INTRAVENOUS EVERY 8 HOURS
Status: DISCONTINUED | OUTPATIENT
Start: 2024-12-05 | End: 2024-12-05 | Stop reason: HOSPADM

## 2024-12-05 RX ORDER — HYDROMORPHONE HYDROCHLORIDE 2 MG/ML
INJECTION, SOLUTION INTRAMUSCULAR; INTRAVENOUS; SUBCUTANEOUS
Status: DISCONTINUED | OUTPATIENT
Start: 2024-12-05 | End: 2024-12-05

## 2024-12-05 RX ORDER — DEXAMETHASONE SODIUM PHOSPHATE 4 MG/ML
INJECTION, SOLUTION INTRA-ARTICULAR; INTRALESIONAL; INTRAMUSCULAR; INTRAVENOUS; SOFT TISSUE
Status: DISCONTINUED | OUTPATIENT
Start: 2024-12-05 | End: 2024-12-05

## 2024-12-05 RX ORDER — PROPOFOL 10 MG/ML
VIAL (ML) INTRAVENOUS
Status: DISCONTINUED | OUTPATIENT
Start: 2024-12-05 | End: 2024-12-05

## 2024-12-05 RX ORDER — IRON,CARBONYL/ASCORBIC ACID 100-250 MG
1 TABLET ORAL DAILY
Qty: 30 TABLET | Refills: 2 | Status: SHIPPED | OUTPATIENT
Start: 2024-12-05

## 2024-12-05 RX ORDER — BUPIVACAINE HYDROCHLORIDE AND EPINEPHRINE 5; 5 MG/ML; UG/ML
INJECTION, SOLUTION EPIDURAL; INTRACAUDAL; PERINEURAL
Status: DISCONTINUED | OUTPATIENT
Start: 2024-12-05 | End: 2024-12-05 | Stop reason: HOSPADM

## 2024-12-05 RX ORDER — KETOROLAC TROMETHAMINE 30 MG/ML
INJECTION, SOLUTION INTRAMUSCULAR; INTRAVENOUS
Status: DISCONTINUED | OUTPATIENT
Start: 2024-12-05 | End: 2024-12-05

## 2024-12-05 RX ORDER — OXYCODONE AND ACETAMINOPHEN 5; 325 MG/1; MG/1
1 TABLET ORAL EVERY 4 HOURS PRN
Status: DISCONTINUED | OUTPATIENT
Start: 2024-12-05 | End: 2024-12-05 | Stop reason: HOSPADM

## 2024-12-05 RX ORDER — MUPIROCIN 20 MG/G
OINTMENT TOPICAL 2 TIMES DAILY
Status: DISCONTINUED | OUTPATIENT
Start: 2024-12-05 | End: 2024-12-05 | Stop reason: HOSPADM

## 2024-12-05 RX ORDER — BISACODYL 10 MG/1
10 SUPPOSITORY RECTAL DAILY PRN
Status: DISCONTINUED | OUTPATIENT
Start: 2024-12-05 | End: 2024-12-05 | Stop reason: HOSPADM

## 2024-12-05 RX ORDER — LIDOCAINE HYDROCHLORIDE 20 MG/ML
INJECTION INTRAVENOUS
Status: DISCONTINUED | OUTPATIENT
Start: 2024-12-05 | End: 2024-12-05

## 2024-12-05 RX ORDER — HYDROMORPHONE HYDROCHLORIDE 1 MG/ML
1 INJECTION, SOLUTION INTRAMUSCULAR; INTRAVENOUS; SUBCUTANEOUS EVERY 6 HOURS PRN
Status: DISCONTINUED | OUTPATIENT
Start: 2024-12-05 | End: 2024-12-05 | Stop reason: HOSPADM

## 2024-12-05 RX ORDER — CEFAZOLIN 2 G/1
2 INJECTION, POWDER, FOR SOLUTION INTRAMUSCULAR; INTRAVENOUS
Status: COMPLETED | OUTPATIENT
Start: 2024-12-05 | End: 2024-12-05

## 2024-12-05 RX ORDER — FAMOTIDINE 20 MG/1
20 TABLET, FILM COATED ORAL
Status: COMPLETED | OUTPATIENT
Start: 2024-12-05 | End: 2024-12-05

## 2024-12-05 RX ORDER — MUPIROCIN 20 MG/G
OINTMENT TOPICAL
Status: DISCONTINUED | OUTPATIENT
Start: 2024-12-05 | End: 2024-12-05 | Stop reason: HOSPADM

## 2024-12-05 RX ORDER — MIDAZOLAM HYDROCHLORIDE 1 MG/ML
2 INJECTION INTRAMUSCULAR; INTRAVENOUS
Status: COMPLETED | OUTPATIENT
Start: 2024-12-05 | End: 2024-12-05

## 2024-12-05 RX ORDER — SODIUM CHLORIDE, SODIUM LACTATE, POTASSIUM CHLORIDE, CALCIUM CHLORIDE 600; 310; 30; 20 MG/100ML; MG/100ML; MG/100ML; MG/100ML
INJECTION, SOLUTION INTRAVENOUS CONTINUOUS
Status: DISCONTINUED | OUTPATIENT
Start: 2024-12-05 | End: 2024-12-05 | Stop reason: HOSPADM

## 2024-12-05 RX ORDER — TRIPROLIDINE/PSEUDOEPHEDRINE 2.5MG-60MG
30 TABLET ORAL EVERY 6 HOURS PRN
Qty: 480 ML | Refills: 2 | Status: SHIPPED | OUTPATIENT
Start: 2024-12-05

## 2024-12-05 RX ORDER — ONDANSETRON 4 MG/1
8 TABLET, ORALLY DISINTEGRATING ORAL EVERY 8 HOURS PRN
Status: DISCONTINUED | OUTPATIENT
Start: 2024-12-05 | End: 2024-12-05 | Stop reason: HOSPADM

## 2024-12-05 RX ORDER — ROCURONIUM BROMIDE 10 MG/ML
INJECTION, SOLUTION INTRAVENOUS
Status: DISCONTINUED | OUTPATIENT
Start: 2024-12-05 | End: 2024-12-05

## 2024-12-05 RX ORDER — GLYCOPYRROLATE 0.2 MG/ML
0.2 INJECTION INTRAMUSCULAR; INTRAVENOUS
Status: COMPLETED | OUTPATIENT
Start: 2024-12-05 | End: 2024-12-05

## 2024-12-05 RX ORDER — SODIUM CHLORIDE 9 MG/ML
INJECTION, SOLUTION INTRAVENOUS CONTINUOUS
Status: DISCONTINUED | OUTPATIENT
Start: 2024-12-05 | End: 2024-12-05 | Stop reason: HOSPADM

## 2024-12-05 RX ORDER — FAMOTIDINE 20 MG/1
20 TABLET, FILM COATED ORAL
Status: DISCONTINUED | OUTPATIENT
Start: 2024-12-05 | End: 2024-12-05 | Stop reason: HOSPADM

## 2024-12-05 RX ORDER — DIPHENHYDRAMINE HYDROCHLORIDE 50 MG/ML
25 INJECTION INTRAMUSCULAR; INTRAVENOUS EVERY 4 HOURS PRN
Status: DISCONTINUED | OUTPATIENT
Start: 2024-12-05 | End: 2024-12-05 | Stop reason: HOSPADM

## 2024-12-05 RX ORDER — ONDANSETRON HYDROCHLORIDE 2 MG/ML
INJECTION, SOLUTION INTRAVENOUS
Status: DISCONTINUED | OUTPATIENT
Start: 2024-12-05 | End: 2024-12-05

## 2024-12-05 RX ORDER — HYDROCODONE BITARTRATE AND ACETAMINOPHEN 500; 5 MG/1; MG/1
TABLET ORAL
Status: DISCONTINUED | OUTPATIENT
Start: 2024-12-05 | End: 2024-12-05 | Stop reason: HOSPADM

## 2024-12-05 RX ADMIN — ROCURONIUM BROMIDE 50 MG: 10 INJECTION, SOLUTION INTRAVENOUS at 07:12

## 2024-12-05 RX ADMIN — KETOROLAC TROMETHAMINE 15 MG: 30 INJECTION, SOLUTION INTRAMUSCULAR; INTRAVENOUS at 08:12

## 2024-12-05 RX ADMIN — FAMOTIDINE 20 MG: 20 TABLET, FILM COATED ORAL at 07:12

## 2024-12-05 RX ADMIN — MUPIROCIN: 20 OINTMENT TOPICAL at 07:12

## 2024-12-05 RX ADMIN — OXYCODONE HYDROCHLORIDE AND ACETAMINOPHEN 1 TABLET: 10; 325 TABLET ORAL at 10:12

## 2024-12-05 RX ADMIN — ONDANSETRON 4 MG: 2 INJECTION INTRAMUSCULAR; INTRAVENOUS at 08:12

## 2024-12-05 RX ADMIN — SUGAMMADEX 200 MG: 100 INJECTION, SOLUTION INTRAVENOUS at 08:12

## 2024-12-05 RX ADMIN — PROPOFOL 150 MG: 10 INJECTION, EMULSION INTRAVENOUS at 07:12

## 2024-12-05 RX ADMIN — CEFAZOLIN 2 G: 2 INJECTION, POWDER, FOR SOLUTION INTRAMUSCULAR; INTRAVENOUS at 07:12

## 2024-12-05 RX ADMIN — DEXAMETHASONE SODIUM PHOSPHATE 4 MG: 4 INJECTION, SOLUTION INTRA-ARTICULAR; INTRALESIONAL; INTRAMUSCULAR; INTRAVENOUS; SOFT TISSUE at 08:12

## 2024-12-05 RX ADMIN — GLYCOPYRROLATE 0.2 MG: 0.2 INJECTION INTRAMUSCULAR; INTRAVENOUS at 07:12

## 2024-12-05 RX ADMIN — MIDAZOLAM HYDROCHLORIDE 2 MG: 1 INJECTION, SOLUTION INTRAMUSCULAR; INTRAVENOUS at 07:12

## 2024-12-05 RX ADMIN — HYDROMORPHONE HYDROCHLORIDE 1 MG: 2 INJECTION, SOLUTION INTRAMUSCULAR; INTRAVENOUS; SUBCUTANEOUS at 07:12

## 2024-12-05 RX ADMIN — LIDOCAINE HYDROCHLORIDE 40 MG: 20 INJECTION, SOLUTION INTRAVENOUS at 07:12

## 2024-12-05 RX ADMIN — SODIUM CHLORIDE: 9 INJECTION, SOLUTION INTRAVENOUS at 07:12

## 2024-12-05 RX ADMIN — HYDROMORPHONE HYDROCHLORIDE 1 MG: 2 INJECTION, SOLUTION INTRAMUSCULAR; INTRAVENOUS; SUBCUTANEOUS at 09:12

## 2024-12-05 RX ADMIN — KETOROLAC TROMETHAMINE 30 MG: 30 INJECTION, SOLUTION INTRAMUSCULAR at 02:12

## 2024-12-05 NOTE — PLAN OF CARE
Patient arousable to verbal stimuli and oriented. Pain 4/10 to lower abdomen. Peripad in place with scant amount of bloody drainage. Indwelling freeman intact, secured,  and unobstructed with clear urine obserced in bag. IV to left hand intact with 1 unit of prbc transfusing. Denies sob  and no distress observed. Meets criteria for transfer of care.

## 2024-12-05 NOTE — OP NOTE
DATE OF PROCEDURE: 12/5/2024    PRE-OP DIAGNOSIS:  Menorrhagia with regular cycle [N92.0]  Dysmenorrhea [N94.6]  BMI 37.0-37.9, adult [Z68.37]    POST-OP DIAGNOSIS:  Post-Op Diagnosis Codes:     * Menorrhagia with regular cycle [N92.0]     * Dysmenorrhea [N94.6]     * BMI 37.0-37.9, adult [Z68.37]    PROCEDURE:   Transvaginal Hysterectomy  Bilateral salpingectomy  Cystoscopy    SURGEON: Blanco Mitchell MD    ASSISTANT: Xochitl Khanna NP    ANESTHESIA: General Endotracheal    ESTIMATED BLOOD LOSS: 25 cc    PROCEDURE IN DETAIL:  After consents were reviewed, patient was taken to the operating room where a time-out was held.  She was placed on the OR table, and after induction of heavy sedation, placed in the dorsal lithotomy position in Hipolito stirrups and prepped and draped in standard sterile fashion.      A weighted speculum was placed in the posterior vagina.  The cervix was identified and grasped with triple tooth Alton tenaculums, anteriorly and posteriorly.  The cervix was then injected with 0.5% Marcaine with epinephrine, circumferentially.  A scalpel was used to make an incision around the cervix at the cervical vaginal junction.  The bladder was dissected from the cervix anteriorly and deflected anteriorly with a right angle retractor.  The posterior cul-de-sac was entered sharply with Jarvis scissors.  The posterior peritoneum was tagged to the posterior vagina with an 0 Vicryl suture.  Uterus was enlarged about 12 cm in length with broad fundus.  A curved Mejia clamp was placed on the left uterosacral ligament.  This pedicle was cut and secured with 0 Vicryl suture and tagged with a hemostat.  The right uterosacral ligament was taken, similarly.  A long weighted speculum was then placed posteriorly to deflect the rectum.  Enseal device was used to take subsequent bits up the cardinal ligaments, uterine arteries, and broad ligaments bilaterally.  Enlarged broad fundus obstructed view of the round ligament tubal  insertion sites.  Knife was used to excise the cervix and the mid portion of the myometrium and endometrium to debulk the uterus.  This allowed visualization of the cornua bilaterally.  Once reaching the level of the tubal insertion and round ligaments, curve Mejia clamps were placed x 2 around each remaining pedicle. The uterus was excised and handed off.  The pedicles were then secures with free-ties followed by fore-and-aft sutures, each of 0 Vicryl. The left Fallopian tube was grasped with a Dixfield clamp and removed with Enseal device.  Right Fallopian tube was removed, similarly.  All pedicles were inspected and were hemostatic.  The peritoneum was closed with a pursestring suture of 0 Vicryl, approximating the uterosacral ligaments and obliterating the cul-de-sac.  The vaginal cuff was then closed with 0-Vicryl sutures with good hemostasis.  The vagina was irrigated.     A cystoscope was then inserted into the urethra and advanced into the bladder.  Normal urethral mucosa and bladder mucosa was noted and peristalsis of both ureters was noted bilaterally.  Cystoscope was removed bladder was drained.     Patient was awakened and taken to the recovery room in stable condition having tolerated the procedure very well.  Sponge lap needle count correct.    I agree with anesthesia's plan of care.

## 2024-12-05 NOTE — CARE UPDATE
Pt taken to the ER doors via wheel chair by then escort to the vehicle. No complications noted at discharge and pt was given discharge instructions.

## 2024-12-05 NOTE — TRANSFER OF CARE
"Anesthesia Transfer of Care Note    Patient: Yaneli Keller    Procedure(s) Performed: Procedure(s) (LRB):  HYSTERECTOMY, TOTAL, VAGINAL, WITH CYSTOSCOPY (N/A)    Patient location: PACU    Anesthesia Type: general    Transport from OR: Transported from OR on room air with adequate spontaneous ventilation    Post pain: adequate analgesia    Post assessment: no apparent anesthetic complications and tolerated procedure well    Post vital signs: stable    Level of consciousness: responds to stimulation    Nausea/Vomiting: no nausea/vomiting    Complications: none    Transfer of care protocol was followed      Last vitals: Visit Vitals  /73   Pulse 77   Temp 36.4 °C (97.5 °F) (Temporal)   Resp 20   Ht 5' 5" (1.651 m)   Wt 101.6 kg (223 lb 15.8 oz)   LMP 12/02/2024 (Exact Date)   SpO2 100%   Breastfeeding No   BMI 37.27 kg/m²     "

## 2024-12-05 NOTE — CARE UPDATE
Notified Dr Mitchell on pt H/H and her progress. Pt urinated not long after her freeman was pulled at lunch.

## 2024-12-05 NOTE — CARE UPDATE
Dr Mitchell said that pt was able to go home and transportation was called. Pt is up to the restroom urinating and getting dressed.

## 2024-12-05 NOTE — DISCHARGE INSTRUCTIONS
Follow-up with Dr. Escobar as tolerated.    Decrease your activity today and until after your appointment.    No heavy lifting or straining, no pushing or pulling. (10# limit)    No driving today, or while taking pain medication.    No intercourse, douching, or tampons.    Showers only, no tub baths.    Notify MD of:  Fever over 100.4  Excessive nausea/vomiting  Pain unrelieved by your pain medication  Drainage of yellow/green from sites/vagina    Mupirocin ointment - apply this evening in both nostrils and twice a day the next 2 days.            Exofin  PATIENT AFTER-CARE INSTRUCTIONS          Your wound has been repaired using Exofin® High Viscosity topical tissue adhesive. The list below is a guide for you to understand and care for your wound following your procedure.          1. Keep the wound dry.     You may occasionally and briefly wet your wound in the shower or bath at the direction of your physician, but do not soak or scrub the wound area. After showering or bathing, gently blot your wound dry with a soft towel.       2. Avoid Topical Medications     Do not apply liquid or ointment medications, lotions, creams, petroleum jelly, mineral oils or any other product to your wound while the Exofin® adhesive film is in place.         3. Do not rub, scratch, or pick at the wound.     Doing so may compromise the integrity of the wound closure and cause scaring.        4. Protect the wound from prolonged sunlight exposure.     Do not use tanning lamps while the film is in place.        5. Check wound appearance.      Some swelling, redness, and pain are common with all wounds and normally will go away as the wound heals. If swelling, redness, or pain increases, or if the wound feels warm to the touch, contact your doctor. Also contact your doctor if the wound edges reopen or separate.        6. Exofin® will naturally slough off between 5 and 10 days after the procedure.     By this time, your wound should be  sufficiently healed. This information is not intended as a substitute for the advice of a physician. For more detailed information, talk to your doctor. Your doctor can choose the best treatment for you in your particular circumstances.               For additional questions and concerns, please consult your doctor.

## 2024-12-05 NOTE — CARE UPDATE
Pt blood was completed when pt rolled into the room. AAO X 3. Pt resting with eyes closed but responds to voice. PACU nurse completed pt blood and her charting. Will continue to monitor.

## 2024-12-05 NOTE — ANESTHESIA PROCEDURE NOTES
Intubation    Date/Time: 12/5/2024 7:46 AM    Performed by: Debbie Villalobos CRNA  Authorized by: Debbie Villalobos CRNA    Intubation:     Induction:  Intravenous    Intubated:  Postinduction    Mask Ventilation:  Easy mask    Attempts:  1    Attempted By:  CRNA    Method of Intubation:  Direct    Blade:  Wise 2    Laryngeal View Grade: Grade IIA - cords partially seen      Difficult Airway Encountered?: No      Complications:  None    Airway Device:  Oral endotracheal tube    Airway Device Size:  7.0    Style/Cuff Inflation:  Cuffed (inflated to minimal occlusive pressure)    Inflation Amount (mL):  6    Tube secured:  21    Secured at:  The lips    Placement Verified By:  Capnometry    Complicating Factors:  None    Findings Post-Intubation:  BS equal bilateral

## 2024-12-06 NOTE — BRIEF OP NOTE
[facility]  [facility]    Surgery Date: 12/5/2024     Surgeons and Role:     * Blanco Mitchell MD - Primary    Assisting Surgeon: None    Pre-op Diagnosis:  Menorrhagia with regular cycle [N92.0]  Dysmenorrhea [N94.6]  BMI 37.0-37.9, adult [Z68.37]    Post-op Diagnosis:  Post-Op Diagnosis Codes:     * Menorrhagia with regular cycle [N92.0]     * Dysmenorrhea [N94.6]     * BMI 37.0-37.9, adult [Z68.37]    Procedure(s) (LRB):  HYSTERECTOMY, TOTAL, VAGINAL, WITH CYSTOSCOPY (N/A)  SALPINGECTOMY (Bilateral)    Anesthesia: General    Operative Findings: see op note    Estimated Blood Loss: * No values recorded between 12/5/2024  8:04 AM and 12/5/2024  9:10 AM *         Specimens:   Specimen (24h ago, onward)       Start     Ordered    12/05/24 0917  Specimen to Pathology Gynecology and Obstetrics  Once        References:    Click here for ordering Quick Tip   Question Answer Comment   Procedure Type: Gynecology and Obstetrics    Specimen Source Uterus Uterus, cervix, bilateral fallopian tubes   Procedure Type: Excision    Clinical Information: TVH    Release to patient Immediate        12/05/24 0916                      Discharge Note    OUTCOME: Patient tolerated treatment/procedure well without complication and is now ready for discharge.    DISPOSITION: Home or Self Care    FINAL DIAGNOSIS:  S/P vaginal hysterectomy    FOLLOWUP: In clinic    DISCHARGE INSTRUCTIONS:    Discharge Procedure Orders   Diet Adult Regular     Lifting restrictions     No driving until:   Order Comments: Off opiods     Pelvic Rest     Notify your health care provider if you experience any of the following:  temperature >100.4     Notify your health care provider if you experience any of the following:  persistent nausea and vomiting or diarrhea     Notify your health care provider if you experience any of the following:  severe uncontrolled pain     Notify your health care provider if you experience any of  the following:  redness, tenderness, or signs of infection (pain, swelling, redness, odor or green/yellow discharge around incision site)     Notify your health care provider if you experience any of the following:  difficulty breathing or increased cough     Notify your health care provider if you experience any of the following:  severe persistent headache     Notify your health care provider if you experience any of the following:  worsening rash     Notify your health care provider if you experience any of the following:  persistent dizziness, light-headedness, or visual disturbances     Notify your health care provider if you experience any of the following:  increased confusion or weakness     Activity as tolerated        Clinical Reference Documents Added to Patient Instructions         Document    HYSTEROSCOPY DISCHARGE INSTRUCTIONS (ENGLISH)

## 2024-12-09 LAB
ABO + RH BLD: NORMAL
ABO + RH BLD: NORMAL
BLD PROD TYP BPU: NORMAL
BLD PROD TYP BPU: NORMAL
BLOOD UNIT EXPIRATION DATE: NORMAL
BLOOD UNIT EXPIRATION DATE: NORMAL
BLOOD UNIT TYPE CODE: 7300
BLOOD UNIT TYPE CODE: 7300
CROSSMATCH INTERPRETATION: NORMAL
CROSSMATCH INTERPRETATION: NORMAL
DISPENSE STATUS: NORMAL
DISPENSE STATUS: NORMAL
UNIT NUMBER: NORMAL
UNIT NUMBER: NORMAL

## 2024-12-12 ENCOUNTER — TELEPHONE (OUTPATIENT)
Dept: OBSTETRICS AND GYNECOLOGY | Facility: CLINIC | Age: 44
End: 2024-12-12
Payer: MEDICAID

## 2024-12-12 LAB — BEAKER SEE SCANNED REPORT: NORMAL

## 2024-12-12 NOTE — TELEPHONE ENCOUNTER
----- Message from Abena sent at 12/12/2024  4:06 PM CST -----  Regarding: PT Message- Surgery  Contact: Patient  Patient is reading her information on her portal in regards to her surgery and has questions that she would like to discuss with Dr Mitchell. She is asking if she had a total hysterectomy? If a cyst was found on one of her tubes? She has other questions but requested she speak to Dr Mitchell.   [phone]

## 2024-12-12 NOTE — TELEPHONE ENCOUNTER
Attempted to contact pt no answer voicemail was left for pt to call office. Dr Mitchell stated that her ovaries was not removed and it shows zero cancer but will discuss it more at her follow up

## 2024-12-13 NOTE — TELEPHONE ENCOUNTER
Called the patient and relayed Dr. Mitchell's message.     She verbalized a clear understanding and agrees with plan of care.

## 2024-12-16 ENCOUNTER — TELEPHONE (OUTPATIENT)
Dept: OBSTETRICS AND GYNECOLOGY | Facility: CLINIC | Age: 44
End: 2024-12-16
Payer: MEDICAID

## 2024-12-16 NOTE — TELEPHONE ENCOUNTER
----- Message from Katarina sent at 12/16/2024  3:24 PM CST -----  Regarding: Med refill  .Type:  RX Refill Request    Who Called:  patient   Refill or New Rx: refill on pain meds sent to [facility] in [place]  [facility] Call Back Number: [phone]  Additional Information:  called requesting refill on pain meds- is unsure of which one she was given but whichever one she can get refilled; had hysterectomy on 12/5, post op set for 1/16

## 2024-12-17 NOTE — TELEPHONE ENCOUNTER
Spoke to Dr Simeon, if needing pain medication will need to come in for appt for evaluation. Strict precautions.    No answer x1. If returns call, please see response.

## 2024-12-18 NOTE — PROGRESS NOTES
Chief Complaint     Multidisciplinary Discussion (Pt is here to discuss pain medicine. Pt states that she is still having pain in her lower abdomen)    HPI:     Patient is a 44 y.o.  s/p TVH 24 presents today with complaints of continued lower abdominal pain.  Describes the pain is in the right lower quadrant and suprapubic areas.  Denies dysuria.  Denies fever.    Pathology:  Uterus, cervix, bilateral fallopian tubes:  Cervix with chronic cervicitis   Endometrial tissue without hyperplasia  Bilateral fallopian tubes with normal mucosa and a unilateral paratubal cyst  Myometrium with no significant histopathologic abnormalities   No atypia or malignancy identified     Gyn History:    Menstrual History  Cycle: No  Menarche Age: 13 years  No Cycle Reason: (!) Surgical  Surgical Reason: hysterectomy (24 TVH BS due to menorrhagia, dysmenorrhea)    Menopause  Menopause Age: 1 years  Post Menopausal Bleeding: No  Hormone Replacement Therapy: No    Pap History  Last pap date: 24  Result: Normal  History of Abnormal Pap: No  HPV Vaccine Completed: No    [place]  Sexually Active: No  STI History: No  Contraception: No    Breast History  Last Breast Imaging Date: Yes  Date: 10/25/24  History of Abnormal Breast Imaging : No  History of Breast Biopsy: No          Past Medical History:   Diagnosis Date    Fibroid     Hypertension     Menorrhagia        Past Surgical History:   Procedure Laterality Date    BILATERAL TUBAL LIGATION      HYSTERECTOMY, TOTAL, VAGINAL, WITH CYSTOSCOPY N/A 2024    Procedure: HYSTERECTOMY, TOTAL, VAGINAL, WITH CYSTOSCOPY;  Surgeon: Blanco Mitchell MD;  Location: [facility] OR;  Service: OB/GYN;  Laterality: N/A;  Transvaginal Hysterectomy  Bilateral salpingectomy  Cystoscopy    Leg repair surgery       SALPINGECTOMY Bilateral 2024    Procedure: SALPINGECTOMY;  Surgeon: Blanco Mitchell MD;  Location: [facility] OR;  Service: OB/GYN;  Laterality: Bilateral;    TONSILLECTOMY          Family History   Problem Relation Name Age of Onset    Ovarian cancer Other First cousin         age unknown 30s    Breast cancer Maternal Aunt          age unknown    Colon cancer Neg Hx      Uterine cancer Neg Hx         OB History          4    Para   4    Term   3       1    AB        Living   1         SAB        IAB        Ectopic        Multiple        Live Births   1                 Current Outpatient Medications on File Prior to Visit   Medication Sig Dispense Refill    amLODIPine (NORVASC) 10 MG tablet Take 1 tablet (10 mg total) by mouth once daily. 30 tablet 1    iron-vitamin C 100-250 mg, ICAR-C, (ICAR-C) 100-250 mg Tab Take 1 tablet by mouth once daily. 30 tablet 2    methocarbamoL (ROBAXIN) 750 MG Tab Take by mouth.      HYDROcodone-acetaminophen (NORCO) 7.5-325 mg per tablet Take 1 tablet by mouth every 6 (six) hours as needed for Pain. (Patient not taking: Reported on 2024) 12 tablet 0    ibuprofen 20 mg/mL oral liquid Take 30 mLs (600 mg total) by mouth every 6 (six) hours as needed for Pain. (Patient not taking: Reported on 2024) 480 mL 2     No current facility-administered medications on file prior to visit.       Review of Systems:       Review of Systems   Constitutional:  Negative for chills and fever.   Gastrointestinal:  Negative for abdominal pain, constipation and diarrhea.   Genitourinary:  Negative for bladder incontinence, decreased libido, dysmenorrhea, dyspareunia, dysuria, flank pain, frequency, genital sores, hematuria, hot flashes, menorrhagia, menstrual problem, pelvic pain, urgency, vaginal bleeding, vaginal discharge, vaginal pain, urinary incontinence, postcoital bleeding, postmenopausal bleeding, vaginal dryness and vaginal odor.        Physical Exam:    /82 (BP Location: Right arm, Patient Position: Sitting)   Wt 101.5 kg (223 lb 12.8 oz)   LMP 2024 (Exact Date)   BMI 37.24 kg/m²     Physical Exam   General Exam:    General  Appearance: alert, in no acute distress, normal, well nourished.  Psych:  Orientation: time, place, person.  Mood/Affect: Normal   Abdomen:  - Soft. Non-tender. No rebound tenderness or guardingNo masses. Liver normal. Spleen normal. No hernia palpable.  Pelvis:   - Vulva: Normal   - Perianal skin: Normal   - Urethra: Normal meatus. Q-tip: Not performed   - Vagina: Vaginal cuff intact. Mild tender on palpation.  No erythema, no drainage.    Assessment:   1. Post-op pain  -     sulfamethoxazole-trimethoprim 800-160mg (BACTRIM DS) 800-160 mg Tab; Take 1 tablet by mouth 2 (two) times daily. for 7 days  Dispense: 14 tablet; Refill: 0  -     metroNIDAZOLE (FLAGYL) 500 MG tablet; Take 1 tablet (500 mg total) by mouth 2 (two) times a day. for 7 days  Dispense: 14 tablet; Refill: 0  -     Cancel: US Pelvis Comp with Transvag NON-OB (xpd; Future; Expected date: 12/23/2024  -     US Pelvis Comp with Transvag NON-OB (xpd; Future; Expected date: 12/23/2024    2. S/P vaginal hysterectomy  -     POCT Urinalysis, Dipstick, Automated, W/O Scope  -     Cancel: US Pelvis Comp with Transvag NON-OB (xpd; Future; Expected date: 12/23/2024  -     Cancel: US Pelvis Comp with Transvag NON-OB (xpd; Future; Expected date: 12/23/2024  -     US Pelvis Comp with Transvag NON-OB (xpd; Future; Expected date: 12/23/2024             Plan:       UA negative   We will treat for possible early cuff cellulitis.  Rx Bactrim BID x7 days  Rx Flagyl BID x7 days  Precautions     Pelvic u/s to be done today     Keep post op restrictions    RTC post op     This note was transcribed by Char Erwin. There may be transcription errors as a result, however minimal. Effort has been made to ensure accuracy of transcription, but any obvious errors or omissions should be clarified with the author of the document.       I agree with the above documentation.

## 2024-12-18 NOTE — TELEPHONE ENCOUNTER
Called the patient and relayed Dr. Mitchell's message. She stated that she would like to make an appt.     I transferred her to front staff to get scheduled.     She agreed with plan of care and verbalized a clear understanding.

## 2024-12-23 ENCOUNTER — OFFICE VISIT (OUTPATIENT)
Dept: OBSTETRICS AND GYNECOLOGY | Facility: CLINIC | Age: 44
End: 2024-12-23
Payer: MEDICAID

## 2024-12-23 VITALS
SYSTOLIC BLOOD PRESSURE: 124 MMHG | DIASTOLIC BLOOD PRESSURE: 82 MMHG | WEIGHT: 223.81 LBS | BODY MASS INDEX: 37.24 KG/M2

## 2024-12-23 DIAGNOSIS — G89.18 POST-OP PAIN: Primary | ICD-10-CM

## 2024-12-23 DIAGNOSIS — Z90.710 S/P VAGINAL HYSTERECTOMY: ICD-10-CM

## 2024-12-23 LAB
BILIRUB UR QL STRIP: NEGATIVE
GLUCOSE UR QL STRIP: NEGATIVE
KETONES UR QL STRIP: NEGATIVE
LEUKOCYTE ESTERASE UR QL STRIP: NEGATIVE
PH, POC UA: 5.5
POC BLOOD, URINE: NEGATIVE
POC NITRATES, URINE: NEGATIVE
PROT UR QL STRIP: NEGATIVE
SP GR UR STRIP: >=1.03 (ref 1–1.03)
UROBILINOGEN UR STRIP-ACNC: 0.2 (ref 0.1–1.1)

## 2024-12-23 PROCEDURE — 99024 POSTOP FOLLOW-UP VISIT: CPT | Mod: ,,, | Performed by: OBSTETRICS & GYNECOLOGY

## 2024-12-23 PROCEDURE — 3079F DIAST BP 80-89 MM HG: CPT | Mod: CPTII,,, | Performed by: OBSTETRICS & GYNECOLOGY

## 2024-12-23 PROCEDURE — 3074F SYST BP LT 130 MM HG: CPT | Mod: CPTII,,, | Performed by: OBSTETRICS & GYNECOLOGY

## 2024-12-23 PROCEDURE — 81003 URINALYSIS AUTO W/O SCOPE: CPT | Mod: QW,,, | Performed by: OBSTETRICS & GYNECOLOGY

## 2024-12-23 RX ORDER — METRONIDAZOLE 500 MG/1
500 TABLET ORAL 2 TIMES DAILY
Qty: 14 TABLET | Refills: 0 | Status: SHIPPED | OUTPATIENT
Start: 2024-12-23 | End: 2024-12-30

## 2024-12-23 RX ORDER — SULFAMETHOXAZOLE AND TRIMETHOPRIM 800; 160 MG/1; MG/1
1 TABLET ORAL 2 TIMES DAILY
Qty: 14 TABLET | Refills: 0 | Status: SHIPPED | OUTPATIENT
Start: 2024-12-23 | End: 2024-12-30

## 2024-12-24 ENCOUNTER — HOSPITAL ENCOUNTER (OUTPATIENT)
Dept: RADIOLOGY | Facility: HOSPITAL | Age: 44
Discharge: HOME | End: 2024-12-24
Attending: OBSTETRICS & GYNECOLOGY
Payer: MEDICAID

## 2024-12-24 DIAGNOSIS — G89.18 POST-OP PAIN: ICD-10-CM

## 2024-12-24 DIAGNOSIS — Z90.710 S/P VAGINAL HYSTERECTOMY: ICD-10-CM

## 2024-12-24 PROCEDURE — 76856 US EXAM PELVIC COMPLETE: CPT | Mod: TC

## 2025-01-08 ENCOUNTER — OFFICE VISIT (OUTPATIENT)
Dept: OBSTETRICS AND GYNECOLOGY | Facility: CLINIC | Age: 45
End: 2025-01-08
Payer: MEDICAID

## 2025-01-08 VITALS
DIASTOLIC BLOOD PRESSURE: 76 MMHG | SYSTOLIC BLOOD PRESSURE: 128 MMHG | HEIGHT: 65 IN | WEIGHT: 228.19 LBS | BODY MASS INDEX: 38.02 KG/M2

## 2025-01-08 DIAGNOSIS — N83.201 BILATERAL OVARIAN CYSTS: Primary | ICD-10-CM

## 2025-01-08 DIAGNOSIS — N83.202 BILATERAL OVARIAN CYSTS: Primary | ICD-10-CM

## 2025-01-08 PROCEDURE — 3078F DIAST BP <80 MM HG: CPT | Mod: CPTII,,, | Performed by: OBSTETRICS & GYNECOLOGY

## 2025-01-08 PROCEDURE — 99024 POSTOP FOLLOW-UP VISIT: CPT | Mod: ,,, | Performed by: OBSTETRICS & GYNECOLOGY

## 2025-01-08 PROCEDURE — 3074F SYST BP LT 130 MM HG: CPT | Mod: CPTII,,, | Performed by: OBSTETRICS & GYNECOLOGY

## 2025-01-08 PROCEDURE — 1159F MED LIST DOCD IN RCRD: CPT | Mod: CPTII,,, | Performed by: OBSTETRICS & GYNECOLOGY

## 2025-01-08 NOTE — PROGRESS NOTES
Chief Complaint     Results (Here for pelvic u/s results due to post-op pain. Denies any pain today. Denies any cramping or bleeding. ) and Medication Refill (Pt would like a refill of iron medicine. States that her last pill was 25. )    HPI:     Patient is a 44 y.o.  s/p TVH 24 presents today to f/u pelvic u/s due to complaints of abdominal pain. Pain resolved.     Pathology:  Uterus, cervix, bilateral fallopian tubes:  Cervix with chronic cervicitis   Endometrial tissue without hyperplasia  Bilateral fallopian tubes with normal mucosa and a unilateral paratubal cyst  Myometrium with no significant histopathologic abnormalities   No atypia or malignancy identified     Pelvic u/s 24:  FINDINGS:  Uterus is surgically absent without evidence for mass like lesion in the surgical bed.  The right ovary measures 3.2 x 1.6 x 1.7 cm.  Left ovary measures 4.5 x 3.2 x 4.6 cm.  Normal arterial inflow and venous outflow waveforms are identified.  A hemorrhagic cyst is identified within the left ovary with retracted clot.  Within the right ovary likely ruptured cyst.  Impression:  Likely represent cyst within the right adnexa and hemorrhagic cyst within the left adnexa.    Per previous note 24:  Patient is a 44 y.o.  s/p TV 24 presents today with complaints of continued lower abdominal pain.  Describes the pain is in the right lower quadrant and suprapubic areas.  Denies dysuria.  Denies fever.      Past Medical History:   Diagnosis Date    Fibroid     Hypertension     Menorrhagia        Past Surgical History:   Procedure Laterality Date    BILATERAL TUBAL LIGATION      HYSTERECTOMY, TOTAL, VAGINAL, WITH CYSTOSCOPY N/A 2024    Procedure: HYSTERECTOMY, TOTAL, VAGINAL, WITH CYSTOSCOPY;  Surgeon: Blanco Mitchell MD;  Location: [facility] OR;  Service: OB/GYN;  Laterality: N/A;  Transvaginal Hysterectomy  Bilateral salpingectomy  Cystoscopy    Leg repair surgery       SALPINGECTOMY  Bilateral 2024    Procedure: SALPINGECTOMY;  Surgeon: Blanco Mitchell MD;  Location: [facility] OR;  Service: OB/GYN;  Laterality: Bilateral;    TONSILLECTOMY         Family History   Problem Relation Name Age of Onset    Ovarian cancer Other First cousin         age unknown 30s    Breast cancer Maternal Aunt          age unknown    Colon cancer Neg Hx      Uterine cancer Neg Hx         OB History          4    Para   4    Term   3       1    AB        Living   1         SAB        IAB        Ectopic        Multiple        Live Births   1                 Current Outpatient Medications on File Prior to Visit   Medication Sig Dispense Refill    amLODIPine (NORVASC) 10 MG tablet Take 1 tablet (10 mg total) by mouth once daily. 30 tablet 1    HYDROcodone-acetaminophen (NORCO) 7.5-325 mg per tablet Take 1 tablet by mouth every 6 (six) hours as needed for Pain. (Patient not taking: Reported on 2024) 12 tablet 0    ibuprofen 20 mg/mL oral liquid Take 30 mLs (600 mg total) by mouth every 6 (six) hours as needed for Pain. (Patient not taking: Reported on 2024) 480 mL 2    iron-vitamin C 100-250 mg, ICAR-C, (ICAR-C) 100-250 mg Tab Take 1 tablet by mouth once daily. 30 tablet 2    methocarbamoL (ROBAXIN) 750 MG Tab Take by mouth.       No current facility-administered medications on file prior to visit.       Review of Systems:       Review of Systems   Constitutional:  Negative for chills and fever.   Gastrointestinal:  Negative for abdominal pain, constipation and diarrhea.   Genitourinary:  Negative for bladder incontinence, decreased libido, dysmenorrhea, dyspareunia, dysuria, flank pain, frequency, genital sores, hematuria, hot flashes, menorrhagia, menstrual problem, pelvic pain, urgency, vaginal bleeding, vaginal discharge, vaginal pain, urinary incontinence, postcoital bleeding, postmenopausal bleeding, vaginal dryness and vaginal odor.        Physical Exam:    /76 (BP Location: Right arm,  "Patient Position: Sitting)   Ht 5' 5" (1.651 m)   Wt 103.5 kg (228 lb 3.2 oz)   LMP 12/02/2024 (Exact Date)   BMI 37.97 kg/m²     Physical Exam   General Exam:    Deferred        Assessment:   1. Bilateral ovarian cysts             Plan:       Reviewed pelvic u/s with patient   F/u US in 6 weeks for ovarian cysts    Keep post op restrictions     RTC next week for postop exam    This note was transcribed by Char Erwin. There may be transcription errors as a result, however minimal. Effort has been made to ensure accuracy of transcription, but any obvious errors or omissions should be clarified with the author of the document.       I agree with the above documentation.         "

## 2025-01-13 NOTE — PROGRESS NOTES
"HPI:   44 y.o. F s/p TVH  on 12/5/2024  here for postop visit. Doing well. Requests refill of Icar.     Pathology:   Pathology:  Uterus, cervix, bilateral fallopian tubes:  Cervix with chronic cervicitis   Endometrial tissue without hyperplasia  Bilateral fallopian tubes with normal mucosa and a unilateral paratubal cyst  Myometrium with no significant histopathologic abnormalities   No atypia or malignancy identified   Gyn History:    Menstrual History  Cycle: No  Menarche Age: 13 years  No Cycle Reason: (!) Surgical  Surgical Reason: hysterectomy    Menopause  Menopause Age: 1 years    Pap History  Last pap date: 09/25/24 (NIL -HPV)  Result: Normal  History of Abnormal Pap: No  HPV Vaccine Completed: No (0/3)    [place]  Sexually Active: Yes  Sexual Orientation: heterosexual  Postcoital Bleeding: No  Dyspareunia: No  Contraception: No    Breast History  Last Breast Imaging Date: Yes  Date: 10/25/24 (Benign)  History of Abnormal Breast Imaging : No  History of Breast Biopsy: No        Physical Exam:   /76   Temp 97.3 °F (36.3 °C)   Ht 5' 5" (1.651 m)   Wt 104.2 kg (229 lb 12.8 oz)   LMP 12/02/2024 (Exact Date)   BMI 38.24 kg/m²   Gen: NAD  Abd: Soft, NT.  Wounds healing well.  No sign of infection.  Pelvis: NEFG. Vagina normal. Cuff healing well.     Assessment:   1. Postop check    2. Anemia, unspecified type  - CBC Without Differential; Future  - iron-vitamin C 100-250 mg, ICAR-C, (ICAR-C) 100-250 mg Tab; Take 1 tablet by mouth once daily.  Dispense: 30 tablet; Refill: 2    Hysterectomy, Total, Vaginal, With Cystoscopy and Salpingectomy - Bilateral       Plan:   Reviewed pathology  May return to normal activity  Keep appt for 6 week repeat pelvic u/s for ovarian cyst     Refill ICAR. CBC to be collected. Precautions    RTC prn/annual     This note was transcribed by Char Erwin. There may be transcription errors as a result, however minimal. Effort has been made to ensure accuracy of " transcription, but any obvious errors or omissions should be clarified with the author of the document.       I agree with the above documentation.

## 2025-01-15 ENCOUNTER — OFFICE VISIT (OUTPATIENT)
Dept: OBSTETRICS AND GYNECOLOGY | Facility: CLINIC | Age: 45
End: 2025-01-15
Payer: MEDICAID

## 2025-01-15 VITALS
TEMPERATURE: 97 F | DIASTOLIC BLOOD PRESSURE: 76 MMHG | HEIGHT: 65 IN | BODY MASS INDEX: 38.29 KG/M2 | SYSTOLIC BLOOD PRESSURE: 132 MMHG | WEIGHT: 229.81 LBS

## 2025-01-15 DIAGNOSIS — D64.9 ANEMIA, UNSPECIFIED TYPE: ICD-10-CM

## 2025-01-15 DIAGNOSIS — Z09 POSTOP CHECK: Primary | ICD-10-CM

## 2025-01-15 PROBLEM — N94.6 DYSMENORRHEA: Status: RESOLVED | Noted: 2024-11-08 | Resolved: 2025-01-15

## 2025-01-15 PROBLEM — N92.0 MENORRHAGIA WITH REGULAR CYCLE: Status: RESOLVED | Noted: 2024-11-08 | Resolved: 2025-01-15

## 2025-01-15 PROCEDURE — 3075F SYST BP GE 130 - 139MM HG: CPT | Mod: CPTII,,, | Performed by: OBSTETRICS & GYNECOLOGY

## 2025-01-15 PROCEDURE — 3078F DIAST BP <80 MM HG: CPT | Mod: CPTII,,, | Performed by: OBSTETRICS & GYNECOLOGY

## 2025-01-15 PROCEDURE — 1159F MED LIST DOCD IN RCRD: CPT | Mod: CPTII,,, | Performed by: OBSTETRICS & GYNECOLOGY

## 2025-01-15 PROCEDURE — 99024 POSTOP FOLLOW-UP VISIT: CPT | Mod: ,,, | Performed by: OBSTETRICS & GYNECOLOGY

## 2025-01-15 RX ORDER — IRON,CARBONYL/ASCORBIC ACID 100-250 MG
1 TABLET ORAL DAILY
Qty: 30 TABLET | Refills: 2 | Status: SHIPPED | OUTPATIENT
Start: 2025-01-15

## 2025-07-24 DIAGNOSIS — D64.9 ANEMIA, UNSPECIFIED TYPE: ICD-10-CM

## 2025-07-25 RX ORDER — IRON,CARBONYL/ASCORBIC ACID 100-250 MG
1 TABLET ORAL DAILY
Qty: 30 TABLET | Refills: 2 | Status: SHIPPED | OUTPATIENT
Start: 2025-07-25

## (undated) DEVICE — SUC YANK POOLE TIP RIGID

## (undated) DEVICE — SUT VICRYL PLUS COAT 0 36IN

## (undated) DEVICE — SYR 20CC LL W/O NDL STR DISP

## (undated) DEVICE — TRAY CATH URETHRAL FOLEY 16FR

## (undated) DEVICE — BLADE SURG CARBON STEEL #10

## (undated) DEVICE — DRAPE HALF SURGICAL 40X58IN

## (undated) DEVICE — PACK ECLIPSE BASIC III SURG

## (undated) DEVICE — SET CYSTO IRRIGATION UNIV SPIK

## (undated) DEVICE — KIT MAJOR SINGLE BASIN

## (undated) DEVICE — DRAPE LEGGINGS CUFF 31X48IN

## (undated) DEVICE — SUT VICRYL PLUS 0 CT-1 18IN

## (undated) DEVICE — GOWN POLY REINF BRTH SLV 2XL

## (undated) DEVICE — DRAPE UNDER BUTTOCKS SUC PORT

## (undated) DEVICE — GLOVE PROTEXIS PI SYN SURG 8.5

## (undated) DEVICE — SUT 0 8-18 IN CTD VICRYL

## (undated) DEVICE — DEVICE ENSEAL X1 LARGE JAW

## (undated) DEVICE — COVER LIGHT HANDLE RIGID GRN

## (undated) DEVICE — SOL NACL INJ 1000ML 0.9%

## (undated) DEVICE — SOL NACL IRR 1000ML BTL

## (undated) DEVICE — NDL ANES SPINAL 18X3.5ST 18G